# Patient Record
Sex: MALE | Race: WHITE | Employment: OTHER | URBAN - METROPOLITAN AREA
[De-identification: names, ages, dates, MRNs, and addresses within clinical notes are randomized per-mention and may not be internally consistent; named-entity substitution may affect disease eponyms.]

---

## 2017-12-31 ENCOUNTER — OFFICE VISIT (OUTPATIENT)
Dept: FAMILY MEDICINE CLINIC | Facility: CLINIC | Age: 72
End: 2017-12-31

## 2017-12-31 VITALS
OXYGEN SATURATION: 96 % | HEART RATE: 73 BPM | SYSTOLIC BLOOD PRESSURE: 128 MMHG | RESPIRATION RATE: 18 BRPM | HEIGHT: 70 IN | DIASTOLIC BLOOD PRESSURE: 80 MMHG | WEIGHT: 180 LBS | TEMPERATURE: 98 F | BODY MASS INDEX: 25.77 KG/M2

## 2017-12-31 DIAGNOSIS — J20.9 ACUTE BRONCHITIS, UNSPECIFIED ORGANISM: Primary | ICD-10-CM

## 2017-12-31 PROCEDURE — 99203 OFFICE O/P NEW LOW 30 MIN: CPT | Performed by: PHYSICIAN ASSISTANT

## 2017-12-31 RX ORDER — LISINOPRIL 10 MG/1
0.5 TABLET ORAL DAILY
COMMUNITY
End: 2022-01-03

## 2017-12-31 RX ORDER — DONEPEZIL HYDROCHLORIDE 10 MG/1
10 TABLET, FILM COATED ORAL NIGHTLY
COMMUNITY
Start: 2017-12-09

## 2017-12-31 RX ORDER — ZOLPIDEM TARTRATE 10 MG/1
10 TABLET ORAL NIGHTLY PRN
COMMUNITY
End: 2020-10-09

## 2017-12-31 RX ORDER — CEFDINIR 300 MG/1
300 CAPSULE ORAL 2 TIMES DAILY
Qty: 20 CAPSULE | Refills: 0 | Status: SHIPPED | OUTPATIENT
Start: 2017-12-31 | End: 2018-01-10

## 2017-12-31 RX ORDER — SIMVASTATIN 40 MG
40 TABLET ORAL NIGHTLY
COMMUNITY
Start: 2017-11-27

## 2017-12-31 NOTE — PROGRESS NOTES
CHIEF COMPLAINT:   Patient presents with:  Chest Congestion: x 1 week     HPI:   Alessia Blanco is a 71year old male who presents for upper and lower respiratory symptoms for  1 weeks.  Patient reports mild nasal congestion, cough with thick, intermittentl EARS: Tragus non tender to palpation bilaterally. External auditory canals clear. TM's pearly, no bulging, no retraction, no fluid, bony landmarks sharp. NOSE: Nostrils patent, thin, clear nasal discharge, nasal mucosa pink and non inflamed.    THROAT: · Do not smoke. Also avoid being exposed to secondhand smoke. · You may use over-the-counter medicines to control fever or pain, unless another medicine was prescribed.  If you have chronic liver or kidney disease or have ever had a stomach ulcer or gastro · Coughing up blood  · Worsening weakness, drowsiness, headache, or stiff neck  · Trouble breathing, wheezing, or pain with breathing  Date Last Reviewed: 9/13/2015  © 3902-9403 [employer]. [address] [facility] [place] [facility], [address] [place] [address].  [facility]

## 2018-01-04 ENCOUNTER — TELEPHONE (OUTPATIENT)
Dept: FAMILY MEDICINE CLINIC | Facility: CLINIC | Age: 73
End: 2018-01-04

## 2018-01-04 NOTE — TELEPHONE ENCOUNTER
Patient called stating he was seen on 12/31 and diagnosed with bronchitis. He reports he was prescribed cefdinir. He states he started taking medication on 12/31 and has been getting an upset stomach and diarrhea after taking the medication.  He stopped jill

## 2020-01-16 PROCEDURE — 88305 TISSUE EXAM BY PATHOLOGIST: CPT | Performed by: INTERNAL MEDICINE

## 2020-10-09 PROBLEM — R06.00 DYSPNEA ON EXERTION: Status: ACTIVE | Noted: 2020-10-09

## 2020-10-09 PROBLEM — E78.2 MIXED HYPERLIPIDEMIA: Status: ACTIVE | Noted: 2020-10-09

## 2020-10-09 PROBLEM — R94.31 ABNORMAL ELECTROCARDIOGRAM: Status: ACTIVE | Noted: 2020-10-09

## 2020-10-09 PROBLEM — R06.09 DYSPNEA ON EXERTION: Status: ACTIVE | Noted: 2020-10-09

## 2020-10-09 PROBLEM — I10 HTN (HYPERTENSION), BENIGN: Status: ACTIVE | Noted: 2020-10-09

## 2021-05-17 PROBLEM — K21.9 ACID REFLUX DISEASE: Status: ACTIVE | Noted: 2020-05-30

## 2021-06-07 PROBLEM — J43.8 OTHER EMPHYSEMA (HCC): Status: RESOLVED | Noted: 2021-06-07 | Resolved: 2021-06-07

## 2021-06-07 PROBLEM — R91.8 PULMONARY NODULES: Status: ACTIVE | Noted: 2021-06-07

## 2021-06-07 PROBLEM — J43.8 OTHER EMPHYSEMA (HCC): Status: ACTIVE | Noted: 2021-06-07

## 2021-06-07 PROBLEM — Z87.891 EX-SMOKER: Status: ACTIVE | Noted: 2021-06-07

## 2021-06-07 PROBLEM — J44.9 COPD (CHRONIC OBSTRUCTIVE PULMONARY DISEASE) (HCC): Status: ACTIVE | Noted: 2021-06-07

## 2025-05-01 ENCOUNTER — HOSPITAL ENCOUNTER (OUTPATIENT)
Dept: ULTRASOUND IMAGING | Facility: HOSPITAL | Age: 80
Discharge: HOME | End: 2025-05-01
Attending: Other
Payer: MEDICARE

## 2025-05-01 DIAGNOSIS — M31.6 TEMPORAL ARTERITIS (HCC): ICD-10-CM

## 2025-05-01 DIAGNOSIS — R51.9 TEMPORAL HEADACHE: ICD-10-CM

## 2025-05-01 PROCEDURE — 93880 EXTRACRANIAL BILAT STUDY: CPT | Performed by: OTHER
